# Patient Record
Sex: FEMALE | Race: WHITE
[De-identification: names, ages, dates, MRNs, and addresses within clinical notes are randomized per-mention and may not be internally consistent; named-entity substitution may affect disease eponyms.]

---

## 2021-03-08 PROBLEM — Z00.00 ENCOUNTER FOR PREVENTIVE HEALTH EXAMINATION: Status: ACTIVE | Noted: 2021-03-08

## 2021-03-22 ENCOUNTER — APPOINTMENT (OUTPATIENT)
Dept: NEUROLOGY | Facility: CLINIC | Age: 51
End: 2021-03-22
Payer: MEDICAID

## 2021-03-22 VITALS
SYSTOLIC BLOOD PRESSURE: 135 MMHG | DIASTOLIC BLOOD PRESSURE: 88 MMHG | HEART RATE: 76 BPM | BODY MASS INDEX: 28.17 KG/M2 | HEIGHT: 64 IN | WEIGHT: 165 LBS

## 2021-03-22 DIAGNOSIS — G43.909 MIGRAINE, UNSPECIFIED, NOT INTRACTABLE, W/OUT STATUS MIGRAINOSUS: ICD-10-CM

## 2021-03-22 DIAGNOSIS — R06.83 SNORING: ICD-10-CM

## 2021-03-22 DIAGNOSIS — L65.9 NONSCARRING HAIR LOSS, UNSPECIFIED: ICD-10-CM

## 2021-03-22 DIAGNOSIS — Z82.0 FAMILY HISTORY OF EPILEPSY AND OTHER DISEASES OF THE NERVOUS SYSTEM: ICD-10-CM

## 2021-03-22 DIAGNOSIS — R41.3 OTHER AMNESIA: ICD-10-CM

## 2021-03-22 DIAGNOSIS — F41.9 ANXIETY DISORDER, UNSPECIFIED: ICD-10-CM

## 2021-03-22 PROCEDURE — 99072 ADDL SUPL MATRL&STAF TM PHE: CPT

## 2021-03-22 PROCEDURE — 99205 OFFICE O/P NEW HI 60 MIN: CPT | Mod: 25

## 2021-03-22 PROCEDURE — 96116 NUBHVL XM PHYS/QHP 1ST HR: CPT | Mod: 59

## 2021-03-22 NOTE — PHYSICAL EXAM
[FreeTextEntry1] : General appearance: The patient is awake and alert, in no acute distress.\par Eyes: PERRL, moist conjunctiva, no scleral icterus.\par ENT: Oropharynx clear of any exudate or lesions. Dentition unremarkable. No lesions on lips or gums.\par Neck: supple, trachea midline. \par Pulmonary: Normal respiratory effort, no audible wheezing.\par Cardiac: Regular rate and rhythm. \par Vascular: No peripheral edema.\par Musculoskeletal: Gait and strength as noted in "Neurologic Examination" below. No clubbing or cyanosis. Normal range of motion.\par Skin: Warm and dry. No rashes or lesions. No bruising.\par Neurologic: See separate "Neurologic Examination" below.\par Psychiatric: Mood, affect and orientation as noted below in "Extended Neurobehavioral Examination".\par \par \par  \par NEUROLOGIC EXAMINATION\par \par Cranial Nerves: \par visual fields full to confrontation\par pupils equal round and reactive to light\par extraocular motion intact without nystagmus\par facial sensation intact symmetrically\par face symmetrical\par hearing intact to conversation bilaterally\par palate raises symmetrically, head turning and shoulder shrug symmetric, tongue midline without deviation with protrusion.\par No dysarthria.\par Motor: muscle tone normal\par            no pronator drift\par            fine finger movements symmetric \par            muscle strength normal in all 4 extremities and normal bulk in all 4 extremities\par Sensory exam: light touch was intact. Romberg's sign was negative\par Coordination: no tremor\par                       intact with finger to nose bilaterally\par                       balance was intact\par Gait: steady with a narrow base, nl speed and turn\par Deep tendon reflexes: 2+ at brachioradialis, biceps, triceps, and patellar bilaterally\par Primitive reflexes: No grasp reflex.  \par Cortical Sensory signs:  No extinction to double simultaneous stimulation.\par \par \par  Activities of Daily Living ([place]): independent vs. needs some help vs. unable/needs major assistance.      \par            --responses were the following: except where noted, indep                                          \par 1. Bathing/showering\par 2. Dressing\par 3. Toileting\par 4. Transferring\par 5. Continence\par 6. Feeding                                                                                                                                                           \par \par Instrumental Activities of Daily Living ([place]): independent vs. needs some help vs. unable/needs major assistance.     \par            --responses were the following: except where noted, indep\par 1. Ability to use telephone\par 2. Shopping\par 3. Food preparation\par 4. Housekeeping\par 5. Laundry\par 6. Transportation (public/arranging rides/driving)\par 7. Responsibility for own medications\par 8. Ability to handle finances

## 2021-03-22 NOTE — ASSESSMENT
[FreeTextEntry1] : The patient is a 50 year old right handed woman with cognitive changes since approx 2'20 coinciding with new anxiety in relation to covid-19 pandmic. No decline in IADLs. On neurobehavioral status testing 3'21 she had errors with working memory, executive function. Intact conversational memory, intact registration, impaired spont recall with full response to cues but some false positives. Semantic fluency better than phonemic. Oriented well. No psyhcomotor slowing. No parkinsonism. \par \par Counseled that her symptoms and screening exam seem reasurring against early stage of neurodegenerative disease like Alzheimers disease. Discussed that I suspect etiology for her symptoms is anxiety. \par Counseled on the relationship between mood and cognition. Discussed how when the mind is elsewhere and mental energy is diverted to inner conversations or numb from overwhelming feelings, and not attending to the task at hand, then won't be able to recall the task/conversation later. Discussed importance of mindfulness and of being evaluated by a psychiatrist for consideration of medication, but also for therapy.\par \par She snores, rec r/o MORENO as contributor.\par Will send for MRI to r/o vascular contribution.\par Will send bloodwork r/o other causes/contributors.\par No spells reported but lives alone, will send for eeg.

## 2021-03-22 NOTE — HISTORY OF PRESENT ILLNESS
[FreeTextEntry1] : The patient is a 50 year old handed woman with PCP Dr. Heather Vides\par they came late but we were able to complete the encounter\par \par Symptoms since approx 2020 \par began during pandemic\par misplaces things. forgets appointments. forgets recent conversations, can cue.\par no wfd \par dec names. no issues with faces\par no unsafe kitchen behaviors\par drives, no issues, daughter feels safe. 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 very worried about family.\par hasn’t seen therapist or psychiatrist.\par tearful talking about it. for example re: her 106yo grandma, afraid that if she had  wouldn’t be able to see her during pandemic. she flew to see her at one point, daughter got sick with covid while she was gone, was reassuring to see that daughter recovered fine though.\par denies depression just anxiety\par thinks its getting a little better.\par no hallucinations\par \par didn’t get covid\par \par sleep: wasn’t sleeping at first with pandemic. now sleeping well. no otc sleeping pills. snores. no acting out dreams\par \par reports headaches. they last 3 days. eyes hurt, back of neck.  pounding.  feels like a sand feeling at the neck. some nausea no vomting. +photophobia. has had these kind of HAs for 15 yrs. sometimes has to stop working because of them. used to be frequent now 1-2x/mos. would take migradorixina migraine med otc from [place] and that helped [has clonixin, an nsaid].; tylenol doesn’t help. would also get IV injections of meds that would help, doesn’t recall what.\par \par reports hair loss for approx 16 yrs, getting worse.\par no hx MRI, said pcp ordered but wasn’t approved\par \par no hx seizure. no hx head trauma\par \par this visit i rec mri, eeg, hst, labs, neuropsych testing, psychiatry, and mindfulness apps

## 2021-03-22 NOTE — PROCEDURE
[FreeTextEntry1] : EXTENDED NEUROBEHAVIORAL STATUS TESTING\par \par [This is a separate procedure note for the Neurobehavioral Status Examination that was performed during the encounter]. \par \par The patient was alert, well groomed in her work uniform from [employer]. She was fluent with accented speech in [place] without paraphasic errors. No anomia in conversation. Comprehension was intact in [place], understood some english but didn’t reply much in english. She was euthymic but became tearful when talking about her anxiety. Reactive affect. No psychomotor slowing. she actively participated in the disucssion\par \par recent memory: can name biden. can mention how biden fell. knows about governor being accused of touching girls. knows what she did over the weekend- work, shopping.\par \par daughter translated for her\par \par MoCA (version 8.1) score out of 30: 17\par Memory index score (MIS) out of 15: 8\par Visuospatial/Executive \par 	Trails: (-1) did 2b 3c etc but didn’t connect the b to 3, when i asked her to go back and connect the rest she couldn’t do it properly\par 	Cube: (-1)\par 	Clock: (-1) hands properly on 11 and 2 but it was more like 1 hand connected them\par Naming: (-1) camel got with phonemic cue\par Memory- registration: intact 4/5 then 5/5\par Attention \par 	Digit span 5F: intact\par 	Digit span 3R: intact\par 	Letter A test: intact\par 	Serial 7 subtraction: (-1) 3/5\par Language \par 	Phrase repetition: (-1)\par 	F word fluency- # words: (-1) 8 and 3 rpeats\par Abstraction\par 	Train/bicycle: transportation\par 	Watch/ruler: (-1)\par Delayed recall score out of 5: (-4) 1 and 1 flase positive brianna\par 	Additional words recalled with category cue: 1 and 1 false positive\par 	Additional words recalled with multiple choice cue: 3\par Orientation: (-1) knew [place] but not the city\par \par Additional neurobehavioral status tests:\par Animal naming fluency- # words: 9\par Praxis\par       Ideomotor limb\par             Transitive\par                     Comb hair: intact b/l\par 	    Brush teeth: intact b/l\par             Intransitive\par 	    Wave goodbye: intact b/l\par 	    Motion "come here": intact b/l\par       Meaningless gestures: intact 4/4\par Right/Left orientation\par 	"Show me your right hand": correct \par 	"Show me your left hand": correct \par 	"With your right hand touch your left ear": correct\par 	"With your right hand, point to my left shoulder": incorrect\par 	"Wheres my left ear": incorrect\par \par INTERPRETATION: \par \par I carefully reviewed the above results of neurobehavioral status testing. The cognitive domains are listed below with my interpretation of whether or not there is an impairment or if performance was within normal limits. \par It should be noted that this testing is distinct from standard neuropsychological testing, which compares the patient's raw scores on different validated batteries of tests to those of their age-matched peers. Therefore subtle deficits may not be apparent on this testing, or instead some incorrect responses may overestimate deficits.\par \par Cognitive domains:\par 	Attention: within normal limits \par 	Working memory: errors\par 	Executive function: dec set shifting, exeec control of figure copy, abstraction\par 	Language: better semantic than phonemic fluency. anomia responded to cue\par 	Memory: intact conversational memory. intact registration, imparied spont recall with false positive. responded to cues but with false positives. repetitions during fluecny task\par 	Visuospatial function: dec exec control\par 	Praxis: within normal limits \par 	Behavior/Mood: appropriate/anxiety and tearful talking about it\par 	Other comments: no psyhcomotor slowing. oriented well. r/l confusion on examiner\par                 Score: MoCA 17/30 in [place], mis 8/15\par \par Please refer to the assessment section of this encounter that documents how to incorporate the interpretation of these results into the patient's diagnosis and plan of care.\par \par 35 min were taken to administer and interpret this extended neurobehavioral evaluation and prepare the report. \par \par Testing start time: 9a\par Testing stop time: 9:20a\par Report time: 15 min\par

## 2021-04-05 LAB
FOLATE SERPL-MCNC: 14.4 NG/ML
T PALLIDUM AB SER QL IA: NEGATIVE
THYROGLOB AB SERPL-ACNC: <20 IU/ML
THYROPEROXIDASE AB SERPL IA-ACNC: 22.6 IU/ML
TSH SERPL-ACNC: 1.12 UIU/ML
VIT B12 SERPL-MCNC: 697 PG/ML

## 2021-04-06 ENCOUNTER — APPOINTMENT (OUTPATIENT)
Dept: DISASTER EMERGENCY | Facility: OTHER | Age: 51
End: 2021-04-06

## 2021-04-09 ENCOUNTER — NON-APPOINTMENT (OUTPATIENT)
Age: 51
End: 2021-04-09

## 2021-04-09 LAB — VIT B1 SERPL-MCNC: 97.1 NMOL/L

## 2021-04-27 ENCOUNTER — APPOINTMENT (OUTPATIENT)
Dept: DISASTER EMERGENCY | Facility: OTHER | Age: 51
End: 2021-04-27

## 2021-04-27 ENCOUNTER — APPOINTMENT (OUTPATIENT)
Dept: NEUROLOGY | Facility: CLINIC | Age: 51
End: 2021-04-27

## 2021-04-29 ENCOUNTER — APPOINTMENT (OUTPATIENT)
Dept: NEUROLOGY | Facility: CLINIC | Age: 51
End: 2021-04-29
Payer: MEDICAID

## 2021-04-29 VITALS — TEMPERATURE: 97.1 F

## 2021-04-29 PROCEDURE — 99072 ADDL SUPL MATRL&STAF TM PHE: CPT

## 2021-04-29 PROCEDURE — 95816 EEG AWAKE AND DROWSY: CPT

## 2021-05-06 ENCOUNTER — APPOINTMENT (OUTPATIENT)
Dept: NEUROLOGY | Facility: CLINIC | Age: 51
End: 2021-05-06
Payer: MEDICAID

## 2021-05-06 VITALS
HEART RATE: 73 BPM | HEIGHT: 64 IN | DIASTOLIC BLOOD PRESSURE: 87 MMHG | WEIGHT: 167 LBS | BODY MASS INDEX: 28.51 KG/M2 | SYSTOLIC BLOOD PRESSURE: 147 MMHG

## 2021-05-06 VITALS — TEMPERATURE: 95.8 F

## 2021-05-06 DIAGNOSIS — G43.009 MIGRAINE W/OUT AURA, NOT INTRACTABLE, W/OUT STATUS MIGRAINOSUS: ICD-10-CM

## 2021-05-06 PROCEDURE — 99213 OFFICE O/P EST LOW 20 MIN: CPT

## 2021-05-06 RX ORDER — UBIDECARENONE 200 MG
200 TABLET ORAL
Qty: 90 | Refills: 0 | Status: ACTIVE | COMMUNITY
Start: 2021-05-06

## 2021-05-06 RX ORDER — SUMATRIPTAN 100 MG/1
100 TABLET, FILM COATED ORAL
Qty: 9 | Refills: 2 | Status: ACTIVE | COMMUNITY
Start: 2021-05-06 | End: 1900-01-01

## 2021-05-06 NOTE — ASSESSMENT
[FreeTextEntry1] : Migraine triggers were reviewed.  She will start coenzyme every 10 200 mg daily for migraine prophylaxis.  At onset of headache she can take sumatriptan.  Potential adverse effects reviewed.  She will keep a headache diary and return for follow-up in 3 months.

## 2021-05-06 NOTE — HISTORY OF PRESENT ILLNESS
[FreeTextEntry1] : 50-year-old woman referred for evaluation of headaches that recur approximately 3 times per month and may last up to 3 to 4 days. No aura.  They are described as throbbing and associated with photophobia and nausea.  Headaches also associated with her menstrual cycle and they occur 3 days prior to and 3 days after.\par \par Recently she was worried about her memory.  There is a family history of memory loss.  Recent work-up was negative which included normal blood testing, EEG and MRI which was performed as wake up a series on April 2, 2021 which I reviewed and was normal.\par \par Family history is significant for migraine.  Her daughter has migraine and patient's father has a history of migraine.

## 2021-05-06 NOTE — CONSULT LETTER
[Dear  ___] : Dear ~ADRIANA, [Consult Letter:] : I had the pleasure of evaluating your patient, [unfilled]. [Please see my note below.] : Please see my note below. [Consult Closing:] : Thank you very much for allowing me to participate in the care of this patient.  If you have any questions, please do not hesitate to contact me. [Sincerely,] : Sincerely, [DrSwetha  ___] : Dr. DICKSON [FreeTextEntry2] : Stephania Mcnair MD

## 2021-07-26 ENCOUNTER — APPOINTMENT (OUTPATIENT)
Dept: NEUROLOGY | Facility: CLINIC | Age: 51
End: 2021-07-26

## 2023-10-24 ENCOUNTER — APPOINTMENT (OUTPATIENT)
Dept: PULMONOLOGY | Facility: CLINIC | Age: 53
End: 2023-10-24

## 2023-10-31 ENCOUNTER — APPOINTMENT (OUTPATIENT)
Dept: CARDIOLOGY | Facility: CLINIC | Age: 53
End: 2023-10-31